# Patient Record
Sex: FEMALE | Race: OTHER
[De-identification: names, ages, dates, MRNs, and addresses within clinical notes are randomized per-mention and may not be internally consistent; named-entity substitution may affect disease eponyms.]

---

## 2018-04-07 ENCOUNTER — HOSPITAL ENCOUNTER (EMERGENCY)
Dept: HOSPITAL 80 - CED | Age: 38
Discharge: HOME | End: 2018-04-07
Payer: SELF-PAY

## 2018-04-07 VITALS — DIASTOLIC BLOOD PRESSURE: 71 MMHG | SYSTOLIC BLOOD PRESSURE: 102 MMHG

## 2018-04-07 DIAGNOSIS — E86.9: ICD-10-CM

## 2018-04-07 DIAGNOSIS — G43.909: Primary | ICD-10-CM

## 2018-04-07 NOTE — EDPHY
H & P


Stated Complaint: headache and dizziness


Time Seen by Provider: 18 15:13


HPI/ROS: 


This patient presents with right frontal headache more than left frontal 

headache pressure in nature, 3 days duration, gradual in onset.  She describes 

the pain as pressure 8/10 intensity.  The pain worsens with movement.  No other 

exacerbating factors are noted.  She has associated nausea and photophobia.  

She has had prior similar headaches that she attributes to migraines.  Usually 

do not last for 3 days however.  Today she noted some mild dizziness associated 

with her symptoms and came in by private vehicle for evaluation.





ROS:  No recent fevers or chills.  No other constitutional symptoms


Neuro:  No recent head injuries.  No focal numbness tingling or weakness.  No 

vision changes.


HEENT:  She had nasal congestion last week that she attributes to cold that has 

resolved.  No facial pain currently.  No sore throat or ear pain.


Pulmonary:  No cough shortness of breath


Cardiovascular:  No lightheadedness.


GI:  Nausea but no vomiting.  No abdominal pain.


:  No complaints new line integumentary:  No skin rash


10 point ROS is otherwise negative





Source: Patient


Exam Limitations: No limitations





- Personal History


LMP (Females 10-55): 1-7 Days Ago


Current Tetanus/Diphtheria Vaccine: Yes


Current Tetanus Diphtheria and Acellular Pertussis (TDAP): Yes


Tetanus Vaccine Date: 





- Medical/Surgical History


Hx Asthma: No


Hx Chronic Respiratory Disease: No


Hx Diabetes: No


Hx Cardiac Disease: No


Hx Renal Disease: No


Hx Cirrhosis: No


Hx Alcoholism: No


Hx HIV/AIDS: No


Hx Splenectomy or Spleen Trauma: No


Other PMH: , migraines





- Family History


Significant Family History: No pertinent family hx





- Social History


Smoking Status: Never smoked


Alcohol Use: Occasionally


Additional Social History: 





She works at [employer]





- Physical Exam


Exam: 





Physical exam:


Vital signs are normal


General:  Patient is in no acute distress.


HEENT:  Is no external evidence of trauma on exam.  Eyes:  Pupils are equal and 

reactive to light.  Extraocular motions are intact.  Optic fundi:  Clear with 

no papilledema or hemorrhage.  Nose atraumatic.  Ears:  Clear bilaterally with 

no hemotympanum.  Oropharynx:  No dental trauma or malocclusion.  No intraoral 

lacerations.


Eyes:  Pupils are equal and reactive to light.  Extraocular motions are intact.

  Optic fundi:  Clear with no papilledema or hemorrhage.


Lungs:  Clear to auscultation bilaterally


Neck:  Supple no meningismus.


Cardiac:  Regular rate and rhythm no murmur gallop or rub.


Abdomen:  Soft nontender no organomegaly


Neuro:  GCS of 15.  Cranial nerves II through XII intact.  Cerebellar exam is 

normal as judged by symmetric rapid hand movements bilaterally.  No pronator 

drift.  No sensory or motor deficits are appreciated.





Initial differential diagnosis:  Migraine, tension headache, CNS lesion, 

intracranial bleed


Constitutional: 


 Initial Vital Signs











Temperature (C)  37 C   18 15:09


 


Heart Rate  78   18 15:09


 


Respiratory Rate  16   18 15:09


 


Blood Pressure  120/71   18 15:09


 


O2 Sat (%)  96   18 15:09








 











O2 Delivery Mode               Room Air














Allergies/Adverse Reactions: 


 





No Known Allergies Allergy (Verified 08/14/15 21:44)


 








Home Medications: 














 Medication  Instructions  Recorded


 


Ondansetron Odt [Zofran Odt] 4 - 8 mg PO Q4PRN PRN #4 tab 18


 


SUMAtriptan [Imitrex 50 MG (RX)] 50 - 100 mg PO Q2H PRN #6 tab 18














Medical Decision Making


ED Course/Re-evaluation: 





IV normal saline bolus





Toradol IV, Reglan and Benadryl IV with improvement down to a 3/10 headache.





On repeat examination the patient still free of any neuro deficits.





I counseled regarding migraine type headaches.





Discussion:  Patient with headache that sound migrainous in that it is 

hemicranial, severe intensity associated with nausea and photophobia, similar 

to prior recurrent headaches that.  She had no neuro deficits or"red flag 

findings in".  She responded well to migraine medications.  Will send her home 

with a trial of Imitrex and Zofran with plan to follow up with Neurology.  She 

understands need to return emergency department should she developed worsening 

symptoms despite the treatment plan.





- Data Points


Medications Given: 


 








Discontinued Medications





Acetaminophen (Tylenol)  975 mg PO EDNOW ONE


   Stop: 18 16:56


   Last Admin: 18 17:06 Dose:  975 mg


Diphenhydramine HCl (Benadryl Injection)  25 mg IVP EDNOW ONE


   Stop: 18 15:17


   Last Admin: 18 15:33 Dose:  25 mg


Sodium Chloride (Ns)  1,000 mls @ 0 mls/hr IV ONCE ONE; Wide Open


   PRN Reason: Protocol


   Stop: 18 15:17


   Last Admin: 18 15:30 Dose:  1,000 mls


Ketorolac Tromethamine (Toradol)  30 mg IVP EDNOW ONE


   Stop: 18 15:17


   Last Admin: 18 15:29 Dose:  30 mg


Metoclopramide HCl (Reglan Injection)  10 mg IVP EDNOW ONE


   Stop: 18 15:17


   Last Admin: 18 15:31 Dose:  10 mg








Departure





- Departure


Disposition: Home, Routine, Self-Care


Clinical Impression: 


 Migraine





Condition: Good


Instructions:  Migraine Headache (ED)


Additional Instructions: 


Diagnosis:  Migraine headache





Plan:  Home to rest





For any recurrent headaches comma take Imitrex, ibuprofen and Zofran for nausea 

if needed.





Follow up primary care physician for any ongoing symptoms





Also, consider follow up with neurologist for headaches for further evaluation.





Return to the emergency department for any significant worsening despite the 

treatment plan.


Referrals: 


JAIME MAST [Other] - As per Instructions


Warren Ríos DO [Medical Doctor] - As per Instructions


Prescriptions: 


Ondansetron Odt [Zofran Odt] 4 - 8 mg PO Q4PRN PRN #4 tab


 PRN Reason: Vomiting


SUMAtriptan [Imitrex 50 MG (RX)] 50 - 100 mg PO Q2H PRN #6 tab


 PRN Reason: migraine